# Patient Record
Sex: FEMALE | Race: WHITE | NOT HISPANIC OR LATINO | URBAN - METROPOLITAN AREA
[De-identification: names, ages, dates, MRNs, and addresses within clinical notes are randomized per-mention and may not be internally consistent; named-entity substitution may affect disease eponyms.]

---

## 2020-12-21 ENCOUNTER — EMERGENCY (EMERGENCY)
Facility: HOSPITAL | Age: 35
LOS: 0 days | Discharge: HOME | End: 2020-12-21
Attending: EMERGENCY MEDICINE | Admitting: EMERGENCY MEDICINE
Payer: COMMERCIAL

## 2020-12-21 VITALS
OXYGEN SATURATION: 99 % | RESPIRATION RATE: 18 BRPM | HEIGHT: 68 IN | WEIGHT: 156.09 LBS | DIASTOLIC BLOOD PRESSURE: 77 MMHG | SYSTOLIC BLOOD PRESSURE: 127 MMHG | HEART RATE: 100 BPM | TEMPERATURE: 98 F

## 2020-12-21 DIAGNOSIS — Z79.1 LONG TERM (CURRENT) USE OF NON-STEROIDAL ANTI-INFLAMMATORIES (NSAID): ICD-10-CM

## 2020-12-21 DIAGNOSIS — U07.1 COVID-19: ICD-10-CM

## 2020-12-21 DIAGNOSIS — Z79.899 OTHER LONG TERM (CURRENT) DRUG THERAPY: ICD-10-CM

## 2020-12-21 DIAGNOSIS — R05 COUGH: ICD-10-CM

## 2020-12-21 DIAGNOSIS — O98.512 OTHER VIRAL DISEASES COMPLICATING PREGNANCY, SECOND TRIMESTER: ICD-10-CM

## 2020-12-21 LAB — SARS-COV-2 RNA SPEC QL NAA+PROBE: DETECTED

## 2020-12-21 PROCEDURE — 71045 X-RAY EXAM CHEST 1 VIEW: CPT | Mod: 26

## 2020-12-21 PROCEDURE — 99284 EMERGENCY DEPT VISIT MOD MDM: CPT

## 2020-12-21 NOTE — ED PROVIDER NOTE - PATIENT PORTAL LINK FT
You can access the FollowMyHealth Patient Portal offered by [facility] by registering at the following website: [email or web address]. By joining [employer]’s FollowMyHealth portal, you will also be able to view your health information using other applications (apps) compatible with our system.

## 2020-12-21 NOTE — ED PROVIDER NOTE - OBJECTIVE STATEMENT
36 yo female, , 20 weeks pregnant,  presents to ed for cough and subj fever for a few days, mild non productive, no specific pain or radiation. denies chills, cp, sob, le swelling, vaginal bleeding or dc.

## 2020-12-21 NOTE — ED PROVIDER NOTE - CLINICAL SUMMARY MEDICAL DECISION MAKING FREE TEXT BOX
Patient presented with cough and fever x 2-3 days. Otherwise afebrile in ED, HD stable, well appearing. Chest xray obtained and negative for pneumothorax, pneumonia, widened mediastinum, evidence of rib fractures, enlarged cardiac silhouette or any other emergent pathologies. Ambulatory without desaturation. COVID swabbed as treat and release. Will discharge home with outpatient follow up. Patient agreeable with plan. Agrees to return to ED for any new or worsening symptoms.

## 2020-12-21 NOTE — ED PROVIDER NOTE - NSFOLLOWUPINSTRUCTIONS_ED_ALL_ED_FT
You are being discharged with viralillness diagnosis and do not require hospitalization.  At this time, only patients who are being hospitalized are tested for COVID-19.         If you are well enough to be dischargedhome and are not in a high risk group to be admitted, you should care for yourself at home exactly like you would if you have Influenza “flu”. Follow all the standard guidelines about washing your hands, covering your cough, etc. If you feel unwell, stay home,rest and drink plenty of clear fluids. Keep track of your symptoms.    You do need to remain home forat least 7 days from the onset of symptoms or 3 days after your fever is completely gone and your respiratory symptoms are better, whichever is longer. You should continue to isolate yourself.     If symptoms worsen or continueand you need to seek medical care, call your healthcare provider in advance, or 9-1-1 in an emergency, and let them know you are a close contact to a person with confirmed COVID-19    You should return to the EmergencyDepartment if you develop worse symptoms, trouble breathing, chest pain, and/or a fever that doesn’t improve with over the counter medications.         Please consider going through the drive-throughtesting unless you are severely ill and need to go to the ED.    -through testing is availableat various location, including [place].  Call [facility] at  [phone] to make an appointment.         How to Set Up Your Home for Self-Quarantineor Self-Isolation         Please refer to this helpful video.    [email or web address]

## 2020-12-21 NOTE — ED PROVIDER NOTE - ATTENDING CONTRIBUTION TO CARE
35 year old female, no pmhx,  at 20 weeks gestation presenting with cough and subjective fever x 2-3 days. States it worsened today so she came to the ED for evaluation. Denies pain and otherwise denies chest pain, dyspnea, palpitations, N/V/D, abdominal pain or leg swelling.    Vital Signs: I have reviewed the initial vital signs.  Constitutional: NAD, well-nourished, appears stated age, no acute distress.  HEENT: Airway patent, moist MM, no erythema/swelling/deformity of oral structures. EOMI, PERRLA.  CV: regular rate, regular rhythm, well-perfused extremities, 2+ b/l DP and radial pulses equal.  Lungs: (+) b/l rhonchi, no increased WOB.  ABD: NTND, no guarding or rebound, no pulsatile mass, no hernias.   MSK: Neck supple, nontender, nl ROM, no stepoff. Chest nontender. Back nontender in TLS spine or to b/l bony structures or flanks. Ext nontender, nl rom, no deformity.   INTEG: Skin warm, dry, no rash.  NEURO: A&Ox3, normal strength, nl sensation throughout, normal speech.   PSYCH: Calm, cooperative, normal affect and interaction.    Well appearing. Will obtain CXR, COVID swab, re-eval.